# Patient Record
Sex: FEMALE | HISPANIC OR LATINO | Employment: FULL TIME | URBAN - METROPOLITAN AREA
[De-identification: names, ages, dates, MRNs, and addresses within clinical notes are randomized per-mention and may not be internally consistent; named-entity substitution may affect disease eponyms.]

---

## 2023-11-14 PROBLEM — L29.9 EAR ITCHING: Status: ACTIVE | Noted: 2023-11-14

## 2023-11-14 PROBLEM — L30.9 ECZEMA: Status: ACTIVE | Noted: 2023-11-14

## 2023-11-14 PROBLEM — R63.5 ABNORMAL WEIGHT GAIN: Status: ACTIVE | Noted: 2023-11-14

## 2023-11-14 PROBLEM — E88.819 INSULIN RESISTANCE: Status: ACTIVE | Noted: 2023-11-14

## 2023-11-14 PROBLEM — E16.1 HYPERINSULINEMIA: Status: ACTIVE | Noted: 2023-11-14

## 2023-11-14 PROBLEM — L83 ACANTHOSIS NIGRICANS: Status: ACTIVE | Noted: 2023-11-14

## 2023-11-14 PROBLEM — L68.0 HIRSUTISM: Status: ACTIVE | Noted: 2023-11-14

## 2023-11-14 PROBLEM — H93.8X3 IRRITATION OF BOTH EARS: Status: ACTIVE | Noted: 2023-11-14

## 2023-11-14 RX ORDER — TRIAMCINOLONE ACETONIDE 5 MG/G
CREAM TOPICAL
COMMUNITY
Start: 2020-03-02 | End: 2023-12-13 | Stop reason: ALTCHOICE

## 2023-11-16 ENCOUNTER — APPOINTMENT (OUTPATIENT)
Dept: AUDIOLOGY | Facility: CLINIC | Age: 18
End: 2023-11-16

## 2023-11-16 ENCOUNTER — APPOINTMENT (OUTPATIENT)
Dept: OTOLARYNGOLOGY | Facility: CLINIC | Age: 18
End: 2023-11-16

## 2023-12-13 ENCOUNTER — OFFICE VISIT (OUTPATIENT)
Dept: PEDIATRICS | Facility: CLINIC | Age: 18
End: 2023-12-13
Payer: COMMERCIAL

## 2023-12-13 VITALS
BODY MASS INDEX: 33.87 KG/M2 | SYSTOLIC BLOOD PRESSURE: 121 MMHG | WEIGHT: 198.4 LBS | HEART RATE: 128 BPM | DIASTOLIC BLOOD PRESSURE: 87 MMHG | HEIGHT: 64 IN

## 2023-12-13 DIAGNOSIS — Z00.121 ENCOUNTER FOR ROUTINE CHILD HEALTH EXAMINATION WITH ABNORMAL FINDINGS: Primary | ICD-10-CM

## 2023-12-13 DIAGNOSIS — Z01.10 ENCOUNTER FOR HEARING EXAMINATION WITHOUT ABNORMAL FINDINGS: ICD-10-CM

## 2023-12-13 DIAGNOSIS — Z23 NEED FOR HPV VACCINATION: ICD-10-CM

## 2023-12-13 DIAGNOSIS — E88.819 INSULIN RESISTANCE: ICD-10-CM

## 2023-12-13 DIAGNOSIS — Z13.31 DEPRESSION SCREEN: ICD-10-CM

## 2023-12-13 DIAGNOSIS — F33.1 MODERATE EPISODE OF RECURRENT MAJOR DEPRESSIVE DISORDER (MULTI): ICD-10-CM

## 2023-12-13 DIAGNOSIS — L68.0 HIRSUTISM: ICD-10-CM

## 2023-12-13 DIAGNOSIS — E16.1 HYPERINSULINEMIA: ICD-10-CM

## 2023-12-13 PROBLEM — L30.9 ECZEMA: Status: RESOLVED | Noted: 2023-11-14 | Resolved: 2023-12-13

## 2023-12-13 PROBLEM — L29.9 EAR ITCHING: Status: RESOLVED | Noted: 2023-11-14 | Resolved: 2023-12-13

## 2023-12-13 PROBLEM — R63.5 ABNORMAL WEIGHT GAIN: Status: RESOLVED | Noted: 2023-11-14 | Resolved: 2023-12-13

## 2023-12-13 PROBLEM — H93.8X3 IRRITATION OF BOTH EARS: Status: RESOLVED | Noted: 2023-11-14 | Resolved: 2023-12-13

## 2023-12-13 PROCEDURE — 90471 IMMUNIZATION ADMIN: CPT | Performed by: PEDIATRICS

## 2023-12-13 PROCEDURE — 96127 BRIEF EMOTIONAL/BEHAV ASSMT: CPT | Performed by: PEDIATRICS

## 2023-12-13 PROCEDURE — 90472 IMMUNIZATION ADMIN EACH ADD: CPT | Performed by: PEDIATRICS

## 2023-12-13 PROCEDURE — 1036F TOBACCO NON-USER: CPT | Performed by: PEDIATRICS

## 2023-12-13 PROCEDURE — 99395 PREV VISIT EST AGE 18-39: CPT | Performed by: PEDIATRICS

## 2023-12-13 PROCEDURE — 90651 9VHPV VACCINE 2/3 DOSE IM: CPT | Performed by: PEDIATRICS

## 2023-12-13 PROCEDURE — 90686 IIV4 VACC NO PRSV 0.5 ML IM: CPT | Performed by: PEDIATRICS

## 2023-12-13 RX ORDER — HYDROXYZINE HYDROCHLORIDE 25 MG/1
25 TABLET, FILM COATED ORAL 2 TIMES DAILY PRN
COMMUNITY
Start: 2023-11-05 | End: 2023-12-13 | Stop reason: ALTCHOICE

## 2023-12-13 RX ORDER — BUSPIRONE HYDROCHLORIDE 5 MG/1
5 TABLET ORAL 2 TIMES DAILY
COMMUNITY
Start: 2023-11-20

## 2023-12-13 RX ORDER — DULOXETIN HYDROCHLORIDE 20 MG/1
CAPSULE, DELAYED RELEASE ORAL
COMMUNITY
Start: 2023-11-05 | End: 2023-12-13 | Stop reason: DRUGHIGH

## 2023-12-13 RX ORDER — DULOXETIN HYDROCHLORIDE 30 MG/1
30 CAPSULE, DELAYED RELEASE ORAL 2 TIMES DAILY
COMMUNITY
Start: 2023-11-20

## 2023-12-13 NOTE — PROGRESS NOTES
Subjective   Jessica is a 18 y.o. female who presents today with her  self for her Health Maintenance and Supervision Exam.    General Health:  Jessica is in good health  Depression, anxiety and ocd- restarted with her psychiatrist and medication 1 month ago . Suicide ideation 2 months ago before meds. Still occ, no plan. Distracts with music, hydroxyzine prn.   PHQA 15, ASQ first 2 positive. Looking forward to graduation  No weapons in home. Discussed having parent keep all meds secured. Also states no etoh or drugs in home. Discussed national suicide hotline number. Store it safe handouts given.   Concerns today: none    Social and Family History:  At home,   Parental support, work/family balance? Yes    Nutrition:  Current diet: eating out 2-3 times a week     Vitamins?supplements? Probiotic       Dental Care:  Jessica has a dental home: Yes  Dental hygiene regularly performed: Yes  Fluoridate water: Yes    Elimination:  Elimination patterns appropriate: Yes  Sleep:  Sleep patterns appropriate: Yes  Sleep problems: No    Behavior/Socialization:  Good relationships with parents and siblings? Yes  Supportive adult relationship? Yes  Permitted to make decisions? Yes  Responsibilities and chores? Yes  Family Meals?   Normal peer relationships? 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   Any educational accommodations:  No  Academically well adjusted: Yes  Performing at grade level: yes  Socially well adjusted:  yes    Activities:  Physical Activity: no  Limited screen/media use:   Extracurricular Activities/Hobbies/Interests: music, piano, writing    Sports Participation Screening:  Pre-sports participation survey questions assessed and passed? Yes    Menstrual Status:  Menarche at age :  Menses: irregular, cramps, ibuprofen helps  Problems: No    Sexual History:  Dating: no  Sexually Active:no    Drugs:  Tobacco: No  Alcohol: No  Uses drugs:  No  Mental Health:  Depression Screening:   Thoughts of self harm/suicide: No    Risk Assessment:  Additional health risks: no  Safety Assessment:  Safety topics reviewed: yes    Objective   Physical Exam  Gen: Patient is alert and in NAD.   HEENT: Head is NC/AT. PERRL. EOMI. No conjunctival injection present. Fundi are NL; no esotropia or exotropia. TMs are transparent with good landmarks. Nasopharynx is without significant edema or rhinorrhea. Oropharynx is clear with MMM.   No tonsillar enlargement or exudates present. Good dentition.  Neck: supple; no lymphadenopathy or masses.  CV: RRR, NL S1/S2, no murmurs.    Resp: CTA bilaterally; no wheezes or rhonchi; work of breathing is NL.    Abdomen: soft, non-tender, non-distended; no HSM or masses; positive bowel sounds.   : NL female genitalia, Abdulaziz stage 4.   Musculoskeletal: Spine is straight; extremities are warm and dry with full ROM.     Neuro: NL gait, muscle tone, strength, and DTRs.     Skin: No significant rashes or lesions.      Assessment/Plan   Healthy 18 y.o. female child.  1. Anticipatory guidance discussed. Gave handout on well child issues at this age.  2. Vision by eye doctor  3. Vaccines as per orders if needed  4. Follow-up visit in 1 year for next well child visit, or sooner as needed.   Obesity, hirsutism, insulin resistance. Seen by endo 2019. No endrocrine etiology of weight gain . Discussed healthy eating and exercise. Increased risk of diabetes. Will check labs and follow up by phone  HPV 2 today will need 3rd in 4-6 months.  GYN when turns 19  Depression, anxiety- under care of psychiatry. Discussed follow up planned but f/up  sooner if concerns of continuing suicide ideation

## 2024-03-07 ENCOUNTER — APPOINTMENT (OUTPATIENT)
Dept: OTOLARYNGOLOGY | Facility: CLINIC | Age: 19
End: 2024-03-07
Payer: COMMERCIAL

## 2024-05-06 ENCOUNTER — APPOINTMENT (OUTPATIENT)
Dept: OTOLARYNGOLOGY | Facility: CLINIC | Age: 19
End: 2024-05-06
Payer: COMMERCIAL

## 2024-06-07 ENCOUNTER — APPOINTMENT (OUTPATIENT)
Dept: PRIMARY CARE | Facility: CLINIC | Age: 19
End: 2024-06-07
Payer: COMMERCIAL

## 2025-07-29 ENCOUNTER — APPOINTMENT (OUTPATIENT)
Dept: PRIMARY CARE | Facility: CLINIC | Age: 20
End: 2025-07-29
Payer: COMMERCIAL

## 2025-07-29 VITALS
HEART RATE: 92 BPM | TEMPERATURE: 97 F | DIASTOLIC BLOOD PRESSURE: 79 MMHG | WEIGHT: 224 LBS | SYSTOLIC BLOOD PRESSURE: 112 MMHG | OXYGEN SATURATION: 99 % | HEIGHT: 64 IN | BODY MASS INDEX: 38.24 KG/M2

## 2025-07-29 DIAGNOSIS — R53.83 FATIGUE, UNSPECIFIED TYPE: ICD-10-CM

## 2025-07-29 DIAGNOSIS — F33.1 MODERATE EPISODE OF RECURRENT MAJOR DEPRESSIVE DISORDER: ICD-10-CM

## 2025-07-29 DIAGNOSIS — E66.812 OBESITY, CLASS II, BMI 35-39.9: ICD-10-CM

## 2025-07-29 DIAGNOSIS — E55.9 VITAMIN D DEFICIENCY: Primary | ICD-10-CM

## 2025-07-29 PROCEDURE — 3008F BODY MASS INDEX DOCD: CPT | Performed by: STUDENT IN AN ORGANIZED HEALTH CARE EDUCATION/TRAINING PROGRAM

## 2025-07-29 PROCEDURE — 1036F TOBACCO NON-USER: CPT | Performed by: STUDENT IN AN ORGANIZED HEALTH CARE EDUCATION/TRAINING PROGRAM

## 2025-07-29 PROCEDURE — 90471 IMMUNIZATION ADMIN: CPT | Performed by: STUDENT IN AN ORGANIZED HEALTH CARE EDUCATION/TRAINING PROGRAM

## 2025-07-29 PROCEDURE — 99204 OFFICE O/P NEW MOD 45 MIN: CPT | Performed by: STUDENT IN AN ORGANIZED HEALTH CARE EDUCATION/TRAINING PROGRAM

## 2025-07-29 PROCEDURE — 90715 TDAP VACCINE 7 YRS/> IM: CPT | Performed by: STUDENT IN AN ORGANIZED HEALTH CARE EDUCATION/TRAINING PROGRAM

## 2025-07-29 RX ORDER — FLUVOXAMINE MALEATE 100 MG/1
150 TABLET, COATED ORAL NIGHTLY
COMMUNITY

## 2025-07-29 ASSESSMENT — ENCOUNTER SYMPTOMS: DEPRESSION: 0

## 2025-07-29 ASSESSMENT — PATIENT HEALTH QUESTIONNAIRE - PHQ9
SUM OF ALL RESPONSES TO PHQ9 QUESTIONS 1 AND 2: 0
2. FEELING DOWN, DEPRESSED OR HOPELESS: NOT AT ALL
1. LITTLE INTEREST OR PLEASURE IN DOING THINGS: NOT AT ALL

## 2025-07-29 ASSESSMENT — PAIN SCALES - GENERAL: PAINLEVEL_OUTOF10: 0-NO PAIN

## 2025-07-29 NOTE — PROGRESS NOTES
Subjective   Patient ID: Jessica Perkins is a 20 y.o. female who presents for Forms/questionnaires.  HPI  Jessica is here to establish care.    Feeling fatigued. Sleep is interuptted throughout the night. Usually able to fall asleep quickly but struggles to stay asleep. Wakes up around 6:00am, falls asleep between 10-11pm.     She drinks pop ~1 can daily. Walks around at her job, no physical exercise outside of work. Drinking water throughout the day.    Following with psych for depression, mood is stable on current regimen.    PMHx: anxiety/depresion  SurgHx: None  FamHx: depression - father  SocialHx: She is working as a teacher/childcare. Never smoker. Never vaped. Never EtOH use. Never drug use. Lives at home with mother and father. Sexually active with 1 partner.     Review of Systems  12-point ROS was reviewed and is negative, unless otherwise noted in HPI    Objective   Vitals:    07/29/25 0911   BP: 112/79   Pulse: 92   Temp: 36.1 °C (97 °F)   SpO2: 99%      Physical Exam  GEN: alert, conversant, NAD  HEENT: PERRL, EOMI  NECK: supple, no LAD appreciated  CHEST: CTAB  CV: S1, S2, RRR, no murmurs appreciated  ABD: soft, NT, ND  EXT: no significant LE edema  SKIN: warm, dry    Assessment/Plan   #Depression/Anxiety  Following with psychiatry at [facility]  - Established with counseling/therapy services  - maintained on buspar 10mg BID, fluvoaxime 150mg nightly, vraylar 1.5mg daily    #Obesity, Class II  - Counseled continued efforts on lifestyle modification including weight loss, diet, and increased exercise for >5 minutes  - Signed referral to nutritionist/dietician    #fatigue  - discussed sleep hygiene  - obtain labs    Health Maintenance:  Vaccines: COVID, Flu, TDAP (update today)  Screening: Paptest (ordered)   Labs: Obtain today     RTC in 6 months for physical, or sooner PRN    Marco Antonio Wilkes DO

## 2025-07-31 LAB
25(OH)D3+25(OH)D2 SERPL-MCNC: 32 NG/ML (ref 30–100)
ALBUMIN SERPL-MCNC: 4.2 G/DL (ref 3.6–5.1)
ALP SERPL-CCNC: 84 U/L (ref 31–125)
ALT SERPL-CCNC: 26 U/L (ref 6–29)
ANION GAP SERPL CALCULATED.4IONS-SCNC: 10 MMOL/L (CALC) (ref 7–17)
AST SERPL-CCNC: 22 U/L (ref 10–30)
BILIRUB SERPL-MCNC: 0.3 MG/DL (ref 0.2–1.2)
BUN SERPL-MCNC: 16 MG/DL (ref 7–25)
CALCIUM SERPL-MCNC: 8.9 MG/DL (ref 8.6–10.2)
CHLORIDE SERPL-SCNC: 107 MMOL/L (ref 98–110)
CHOLEST SERPL-MCNC: 175 MG/DL
CHOLEST/HDLC SERPL: 4.9 (CALC)
CO2 SERPL-SCNC: 22 MMOL/L (ref 20–32)
CREAT SERPL-MCNC: 0.96 MG/DL (ref 0.5–0.96)
EGFRCR SERPLBLD CKD-EPI 2021: 86 ML/MIN/1.73M2
ERYTHROCYTE [DISTWIDTH] IN BLOOD BY AUTOMATED COUNT: 15.2 % (ref 11–15)
EST. AVERAGE GLUCOSE BLD GHB EST-MCNC: 120 MG/DL
EST. AVERAGE GLUCOSE BLD GHB EST-SCNC: 6.6 MMOL/L
GLUCOSE SERPL-MCNC: 102 MG/DL (ref 65–99)
HBA1C MFR BLD: 5.8 %
HCT VFR BLD AUTO: 40.5 % (ref 35–45)
HDLC SERPL-MCNC: 36 MG/DL
HGB BLD-MCNC: 12.7 G/DL (ref 11.7–15.5)
LDLC SERPL CALC-MCNC: 116 MG/DL (CALC)
MCH RBC QN AUTO: 27.1 PG (ref 27–33)
MCHC RBC AUTO-ENTMCNC: 31.4 G/DL (ref 32–36)
MCV RBC AUTO: 86.4 FL (ref 80–100)
NONHDLC SERPL-MCNC: 139 MG/DL (CALC)
PLATELET # BLD AUTO: 156 THOUSAND/UL (ref 140–400)
PMV BLD REES-ECKER: 10.9 FL (ref 7.5–12.5)
POTASSIUM SERPL-SCNC: 4.3 MMOL/L (ref 3.5–5.3)
PROT SERPL-MCNC: 7.1 G/DL (ref 6.1–8.1)
RBC # BLD AUTO: 4.69 MILLION/UL (ref 3.8–5.1)
SODIUM SERPL-SCNC: 139 MMOL/L (ref 135–146)
TRIGL SERPL-MCNC: 124 MG/DL
TSH SERPL-ACNC: 1.8 MIU/L
VIT B12 SERPL-MCNC: 492 PG/ML (ref 200–1100)
WBC # BLD AUTO: 6.3 THOUSAND/UL (ref 3.8–10.8)

## 2025-08-04 ENCOUNTER — RESULTS FOLLOW-UP (OUTPATIENT)
Dept: PRIMARY CARE | Facility: CLINIC | Age: 20
End: 2025-08-04
Payer: COMMERCIAL

## 2025-08-04 NOTE — TELEPHONE ENCOUNTER
Result Communication    Resulted Orders   Vitamin B12   Result Value Ref Range    VITAMIN B12 492 200 - 1,100 pg/mL   Vitamin D 25-Hydroxy,Total (for eval of Vitamin D levels)   Result Value Ref Range    VITAMIN D,25-OH,TOTAL,IA 32 30 - 100 ng/mL      Comment:      Vitamin D Status         25-OH Vitamin D:     Deficiency:                    <20 ng/mL  Insufficiency:             20 - 29 ng/mL  Optimal:                 > or = 30 ng/mL     For 25-OH Vitamin D testing on patients on   D2-supplementation and patients for whom quantitation   of D2 and D3 fractions is required, the QuestAssureD(TM)  25-OH VIT D, (D2,D3), LC/MS/MS is recommended: order   code 65798 (patients >2yrs).     See Note 1     Note 1     For additional information, please refer to   http://education.[employer]/faq/HEU892   (This link is being provided for informational/  educational purposes only.)     Lipid Panel   Result Value Ref Range    CHOLESTEROL, TOTAL 175 <200 mg/dL    HDL CHOLESTEROL 36 (L) > OR = 50 mg/dL    TRIGLYCERIDES 124 <150 mg/dL    LDL-CHOLESTEROL 116 (H) mg/dL (calc)      Comment:      Reference range: <100     Desirable range <100 mg/dL for primary prevention;    <70 mg/dL for patients with CHD or diabetic patients   with > or = 2 CHD risk factors.     LDL-C is now calculated using the Howard-Larson   calculation, which is a validated novel method providing   better accuracy than the Friedewald equation in the   estimation of LDL-C.   Howard DODGE et al. ASHLEY. 2013;310(19): 4868-0219   (http://education.[employer]/faq/XNN624)      CHOL/HDLC RATIO 4.9 <5.0 (calc)    NON HDL CHOLESTEROL 139 (H) <130 mg/dL (calc)      Comment:      For patients with diabetes plus 1 major ASCVD risk   factor, treating to a non-HDL-C goal of <100 mg/dL   (LDL-C of <70 mg/dL) is considered a therapeutic   option.     Hemoglobin A1C   Result Value Ref Range    HEMOGLOBIN A1c 5.8 (H) <5.7 %      Comment:      For someone without known  diabetes, a hemoglobin   A1c value between 5.7% and 6.4% is consistent with  prediabetes and should be confirmed with a   follow-up test.     For someone with known diabetes, a value <7%  indicates that their diabetes is well controlled. A1c  targets should be individualized based on duration of  diabetes, age, comorbid conditions, and other  considerations.     This assay result is consistent with an increased risk  of diabetes.     Currently, no consensus exists regarding use of  hemoglobin A1c for diagnosis of diabetes for children.         eAG (mg/dL) 120 mg/dL    eAG (mmol/L) 6.6 mmol/L   Comprehensive Metabolic Panel   Result Value Ref Range    GLUCOSE 102 (H) 65 - 99 mg/dL      Comment:                    Fasting reference interval     For someone without known diabetes, a glucose value  between 100 and 125 mg/dL is consistent with  prediabetes and should be confirmed with a  follow-up test.         UREA NITROGEN (BUN) 16 7 - 25 mg/dL    CREATININE 0.96 0.50 - 0.96 mg/dL    EGFR 86 > OR = 60 mL/min/1.73m2    SODIUM 139 135 - 146 mmol/L    POTASSIUM 4.3 3.5 - 5.3 mmol/L    CHLORIDE 107 98 - 110 mmol/L    CARBON DIOXIDE 22 20 - 32 mmol/L    ELECTROLYTE BALANCE 10 7 - 17 mmol/L (calc)    CALCIUM 8.9 8.6 - 10.2 mg/dL    PROTEIN, TOTAL 7.1 6.1 - 8.1 g/dL    ALBUMIN 4.2 3.6 - 5.1 g/dL    BILIRUBIN, TOTAL 0.3 0.2 - 1.2 mg/dL    ALKALINE PHOSPHATASE 84 31 - 125 U/L    AST 22 10 - 30 U/L    ALT 26 6 - 29 U/L   CBC   Result Value Ref Range    WHITE BLOOD CELL COUNT 6.3 3.8 - 10.8 Thousand/uL    RED BLOOD CELL COUNT 4.69 3.80 - 5.10 Million/uL    HEMOGLOBIN 12.7 11.7 - 15.5 g/dL    HEMATOCRIT 40.5 35.0 - 45.0 %    MCV 86.4 80.0 - 100.0 fL    MCH 27.1 27.0 - 33.0 pg    MCHC 31.4 (L) 32.0 - 36.0 g/dL      Comment:      For adults, a slight decrease in the calculated MCHC  value (in the range of 30 to 32 g/dL) is most likely  not clinically significant; however, it should be  interpreted with caution in correlation with  other  red cell parameters and the patient's clinical  condition.      RDW 15.2 (H) 11.0 - 15.0 %    PLATELET COUNT 156 140 - 400 Thousand/uL    MPV 10.9 7.5 - 12.5 fL   TSH with reflex to Free T4 if abnormal   Result Value Ref Range    TSH W/REFLEX TO FT4 1.80 mIU/L      Comment:                Reference Range                         > or = 20 Years  0.40-4.50                              Pregnancy Ranges            First trimester    0.26-2.66            Second trimester   0.55-2.73            Third trimester    0.43-2.91         4:12 PM    Attempted to call regarding recent lab results. Left message for patient to return call to office to discuss lab results.

## 2025-09-09 ENCOUNTER — APPOINTMENT (OUTPATIENT)
Dept: ALLERGY | Facility: CLINIC | Age: 20
End: 2025-09-09
Payer: COMMERCIAL

## 2025-10-08 ENCOUNTER — APPOINTMENT (OUTPATIENT)
Dept: OBSTETRICS AND GYNECOLOGY | Facility: CLINIC | Age: 20
End: 2025-10-08
Payer: COMMERCIAL

## 2026-01-06 ENCOUNTER — APPOINTMENT (OUTPATIENT)
Dept: PRIMARY CARE | Facility: CLINIC | Age: 21
End: 2026-01-06
Payer: COMMERCIAL